# Patient Record
Sex: FEMALE | NOT HISPANIC OR LATINO
[De-identification: names, ages, dates, MRNs, and addresses within clinical notes are randomized per-mention and may not be internally consistent; named-entity substitution may affect disease eponyms.]

---

## 2023-03-23 ENCOUNTER — APPOINTMENT (OUTPATIENT)
Dept: OTOLARYNGOLOGY | Facility: CLINIC | Age: 53
End: 2023-03-23

## 2024-11-01 ENCOUNTER — EMERGENCY (EMERGENCY)
Facility: HOSPITAL | Age: 54
LOS: 1 days | Discharge: HOME | End: 2024-11-01
Attending: STUDENT IN AN ORGANIZED HEALTH CARE EDUCATION/TRAINING PROGRAM | Admitting: STUDENT IN AN ORGANIZED HEALTH CARE EDUCATION/TRAINING PROGRAM
Payer: COMMERCIAL

## 2024-11-01 VITALS
RESPIRATION RATE: 18 BRPM | WEIGHT: 145.95 LBS | OXYGEN SATURATION: 97 % | DIASTOLIC BLOOD PRESSURE: 88 MMHG | HEART RATE: 93 BPM | SYSTOLIC BLOOD PRESSURE: 138 MMHG | TEMPERATURE: 98 F

## 2024-11-01 PROCEDURE — 71046 X-RAY EXAM CHEST 2 VIEWS: CPT | Mod: 26

## 2024-11-01 PROCEDURE — 93010 ELECTROCARDIOGRAM REPORT: CPT

## 2024-11-01 PROCEDURE — 99285 EMERGENCY DEPT VISIT HI MDM: CPT

## 2024-11-01 RX ORDER — ACETAMINOPHEN 500 MG
650 TABLET ORAL ONCE
Refills: 0 | Status: COMPLETED | OUTPATIENT
Start: 2024-11-01 | End: 2024-11-01

## 2024-11-01 RX ORDER — IBUPROFEN 200 MG
600 TABLET ORAL ONCE
Refills: 0 | Status: COMPLETED | OUTPATIENT
Start: 2024-11-01 | End: 2024-11-01

## 2024-11-01 RX ADMIN — Medication 650 MILLIGRAM(S): at 02:57

## 2024-11-01 RX ADMIN — Medication 600 MILLIGRAM(S): at 02:57

## 2024-11-01 NOTE — ED PROVIDER NOTE - PHYSICAL EXAMINATION
GEN: Patient awake and alert. No acute distress, non-toxic. Well appearing.   Head: Normocephalic, atraumatic.  Neck: Nontender, full ROM.   Eyes: PERRLA b/l. EOMI, no scleral icterus, no conjunctival injection. Moist mucous membranes.  CARDIAC: RRR. Normal S1, S2. No murmur, rubs, or gallops. No peripheral edema noted.  PULM: Speaking in full sentences. CTA B/L no wheeze, rales or rhonchi. No signs of respiratory distress, no accessory muscle usage or nasal flaring.  ABD: Soft, nontender, nondistended. No rebound, no involuntary guarding.   MSK: Moving all extremities spontaneously. Full ROM in extremities. No obvious deformity. No anterior chest wall TTP.  NEURO: A&Ox3, no focal neurological deficits, CN 2-12 grossly intact. Following simple commands.  Gait normal.  SKIN: Warm, dry, no rash, no lesions, no open wounds. No urticaria. No jaundice.

## 2024-11-01 NOTE — ED PROVIDER NOTE - IV ALTEPLASE DOOR HIDDEN
[facility]  Cardiology  Progress Note    Patient Name: Kristopher Kevin  MRN: [id number]  Admission Date: 4/8/2019  Hospital Length of Stay: 2 days  Code Status: Full Code   Attending Physician: Nguyễn Moise MD   Primary Care Physician: Jhoan Lundy MD  Expected Discharge Date:   Principal Problem:NSTEMI (non-ST elevated myocardial infarction)    Subjective:     Hospital Course:   4-8:  Admitted with hypertensive emergency  4-9:  Status post PCI of the LAD    Interval History:  No complaints of chest pain shortness of breath this a.m..  Feels like he is getting a little bit better.  He did have pulseless VT requiring defibrillation and was started on amiodarone last night.    Telemetry:  Normal sinus rhythm    Review of Systems   All other systems reviewed and are negative.    Objective:     Vital Signs (Most Recent):  Temp: 96.9 °F (36.1 °C) (04/10/19 0800)  Pulse: 77 (04/10/19 0826)  Resp: (!) 23 (04/10/19 0826)  BP: 111/78 (04/10/19 0800)  SpO2: 96 % (04/10/19 0826) Vital Signs (24h Range):  Temp:  [96.9 °F (36.1 °C)-100.4 °F (38 °C)] 96.9 °F (36.1 °C)  Pulse:  [] 77  Resp:  [20-37] 23  SpO2:  [94 %-100 %] 96 %  BP: ()/(53-83) 111/78     Weight: 109.3 kg (241 lb)  Body mass index is 33.61 kg/m².     SpO2: 96 %  O2 Device (Oxygen Therapy): nasal cannula      Intake/Output Summary (Last 24 hours) at 4/10/2019 0909  Last data filed at 4/10/2019 0800  Gross per 24 hour   Intake 1039.62 ml   Output 5140 ml   Net -4100.38 ml       Lines/Drains/Airways     Drain                 Urethral Catheter 04/08/19 0630 Non-latex;Straight-tip 16 Fr. 2 days          Peripheral Intravenous Line                 Peripheral IV - Single Lumen 04/08/19 0251 Left Hand 2 days         Peripheral IV - Single Lumen 04/08/19 0251 Right Antecubital 2 days                Physical Exam   Constitutional: He is oriented to person, place, and time. He appears well-developed and well-nourished. No distress.    HENT:   Head: Normocephalic and atraumatic.   Eyes: Pupils are equal, round, and reactive to light. Conjunctivae and EOM are normal.   Neck: Normal range of motion. Neck supple. No thyromegaly present.   Cardiovascular: Normal rate, regular rhythm and normal heart sounds.   No murmur heard.  Pulmonary/Chest: Effort normal and breath sounds normal. No respiratory distress. He has no wheezes. He has no rales. He exhibits no tenderness.   Abdominal: Soft. Bowel sounds are normal.   Musculoskeletal: He exhibits no edema.   Neurological: He is alert and oriented to person, place, and time.   Skin: Skin is warm and dry.   Psychiatric: He has a normal mood and affect. His behavior is normal.       Significant Labs:   CMP   Recent Labs   Lab 04/09/19  0315 04/10/19  0238   * 135*   K 3.7 3.5    99   CO2 23 27   * 114*   BUN 22* 24*   CREATININE 0.9 1.0   CALCIUM 8.9 8.9   PROT 6.3 6.3   ALBUMIN 2.3* 2.1*   BILITOT 1.4* 1.3*   ALKPHOS 64 70   AST 19 28   ALT 39 36   ANIONGAP 10 9   ESTGFRAFRICA >60 >60   EGFRNONAA >60 >60   , CBC   Recent Labs   Lab 04/09/19  0315 04/10/19  0238   WBC 10.63 10.73   HGB 12.1* 11.5*   HCT 37.3* 35.5*   * 443*   , INR No results for input(s): INR, PROTIME in the last 48 hours., Lipid Panel No results for input(s): CHOL, HDL, LDLCALC, TRIG, CHOLHDL in the last 48 hours. and Troponin   Recent Labs   Lab 04/09/19 0315   TROPONINI 4.694*       Significant Imaging: Echocardiogram:   Transthoracic echo (TTE) complete (Cupid Only):   Results for orders placed or performed during the hospital encounter of 04/08/19   Transthoracic echo (TTE) 2D with Color Flow   Result Value Ref Range    BSA 2.34 m2    TDI SEPTAL 0.04     LV LATERAL E/E' RATIO 13.38     LV SEPTAL E/E' RATIO 26.75     LA WIDTH 4.34 cm    AORTIC VALVE CUSP SEPERATION 2.58 cm    TDI LATERAL 0.08     PV PEAK VELOCITY 1.09 cm/s    LVIDD 4.70 3.5 - 6.0 cm    IVS 1.51 (A) 0.6 - 1.1 cm    PW 1.45 (A) 0.6 - 1.1 cm     Ao root annulus 3.58 cm    LVIDS 3.98 2.1 - 4.0 cm    FS 15 28 - 44 %    LA volume 59.68 cm3    Sinus 3.23 cm    STJ 2.78 cm    Ascending aorta 3.04 cm    LV mass 288.17 g    LA size 2.94 cm    RVDD 4.27 cm    TAPSE 2.00 cm    RV S' 16.79 m/s    Left Ventricle Relative Wall Thickness 0.62 cm    AV mean gradient 4.42 mmHg    AV valve area 2.99 cm2    AV Velocity Ratio 0.98     AV index (prosthetic) 0.92     E/A ratio 2.02     Mean e' 0.06     E wave decelartion time 151.03 msec    IVRT 0.10 msec    Pulm vein S/D ratio 0.69     LVOT diameter 2.03 cm    LVOT area 3.23 cm2    LVOT peak shree 8.0989622881 m/s    LVOT peak VTI 22.12 cm    Ao peak shree 1.23 m/s    Ao VTI 23.93 cm    LVOT stroke volume 71.56 cm3    AV peak gradient 6.05 mmHg    E/E' ratio 17.83     MV Peak E Shree 1.07 m/s    TR Max Shree 3.06 m/s    MV Peak A Shree 0.53 m/s    PV Peak S Shree 0.44 m/s    PV Peak D Shree 0.64 m/s    LV Systolic Volume 69.32 mL    LV Systolic Volume Index 30.4 mL/m2    LV Diastolic Volume 102.41 mL    LV Diastolic Volume Index 44.86 mL/m2    LA Volume Index 26.1 mL/m2    LV Mass Index 126.2 g/m2    RA Major Axis 4.58 cm    Left Atrium Minor Axis 5.39 cm    Left Atrium Major Axis 5.62 cm    Triscuspid Valve Regurgitation Peak Gradient 37.45 mmHg    RA Width 4.30 cm    Right Atrial Pressure (from IVC) 3 mmHg    TV rest pulmonary artery pressure 40 mmHg   · Moderately decreased left ventricular systolic function. The estimated ejection fraction is 30%  · Concentric left ventricular hypertrophy.  · Grade III (severe) left ventricular diastolic dysfunction consistent with restrictive physiology.  · Elevated left atrial pressure.  · Mildly to moderately reduced right ventricular systolic function.  · The estimated PA systolic pressure is 40 mm Hg     R/LHC:  · Single vessel coronary artery disease.  · Successful PCI of culprit proximal LAD lesion.  · Prox LAD lesion , 80% stenosed reduced to 0%..  · A STENT RESOLUTE MARC [id number] stent was  show successfully placed at 12 ISABELA  · Filling pressures on the right and left are severely elevated. Pulmonary HTN is moderate.  · Post stent deployment IVUS showed adequate deployment without significant residual at the stent edges        Assessment and Plan:     Brief HPI:  VT this a.m. requiring defibrillation.  Stable now on amiodarone.  Correction of electrolytes and continue diuresis.    * NSTEMI (non-ST elevated myocardial infarction)  late presenting MI  Status post PCI of the LAD  Continue medicines for secondary prevention    VT (ventricular tachycardia)  Post MI received defibrillation 4-10  On amiodarone IV  Also with electrolyte abnormalities to be corrected by primary  Will need life vest prior to discharge    Acute congestive heart failure  Acute on chronic Combined systolic and diastolic  Improved post diuresis  Decrease Lasix to 40 IV b.i.d.    Hypertensive emergency  Stable after addition oral meds      Diabetes mellitus type II, uncontrolled  Per IM    Tobacco use disorder  Counseled    Hyperlipidemia  Initiate statin  Follow LFTs    Obesity, unspecified  Diet and exercise    Abnormal LFTs  Currently stable and likely congestive  Received amiodarone in the ED  Currently will do half dose statin and continue follow closely    Acute respiratory failure with hypoxia and hypercapnia  Secondary to pulmonary edema  Currently stable on BiPAP  Pulmonary following      Continue ICU observation    VTE Risk Mitigation (From admission, onward)        Ordered     IP VTE HIGH RISK PATIENT  Once      04/08/19 0519     Place NANCY hose  Until discontinued      04/08/19 0519     Place sequential compression device  Until discontinued      04/08/19 0519          Adrian Jacobo MD  Cardiology  [facility]

## 2024-11-01 NOTE — ED PROVIDER NOTE - PROGRESS NOTE DETAILS
Chuck Gomez MD PGY2: Patient reassessed, stable. CXR negative. Patient declining labs at this time, reports had normal labs including troponin yesterday for same pain. I spoke with the patient and recommended further evaluation and management for their symptoms, however they would prefer to go home right now rather than stay for further management in the ED, CDU or hospital. They verbalize understanding that this is not the optimal or recommended plan. Patient is alert and oriented to person, place and time and demonstrates understanding of risks and consequences in their decision. The patient would be appropriate for outpatient evaluation and management, but with the important caveat that they must follow up closely with primary care doctor as soon as possible and monitor their symptoms very closely with specific instructions on strict ED return precautions.

## 2024-11-01 NOTE — ED ADULT NURSE NOTE - OBJECTIVE STATEMENT
Pt arrives to ED A&Ox4, ambulatory at baseline. Pt C/O left sided chest discomfort x 1 day. Pt is an employee upstairs. PMHx of HTN. Pt denies SOB, dizziness, headache. Respirations are even and unlabored, abdomen is soft and nondistended, capillary refill is 2 seconds bilaterally. Pt does not want blood work at this time. Pt only wants chest xray. Bed in lowest position, safety maintained.

## 2024-11-01 NOTE — ED PROVIDER NOTE - NSFOLLOWUPINSTRUCTIONS_ED_ALL_ED_FT
You were seen in the ED today for chest pain.     Your work up included XRAY. Your results are included in your paperwork.    You opted not to repeat blood work or cardiac enzyme (troponin) test as you had a reported negative test yesterday.     Please follow up promptly with your cardiologist within the next 3 days.     You may take 500-1000 mg acetaminophen every 6 hours, as needed for pain.  You may take 600 mg ibuprofen every 8 hours, with food, as needed for pain.     Follow up with your primary physician in 1-2 days. If needed call [phone] to find a primary care physician or call  [phone] to schedule an appointment with the general medicine.    If you experience any of the following please return to the ED:  - Chest pain  - Trouble breathing   - You cough or vomit blood.  - Your bowel movements are black or bloody.  - You cannot stop vomiting, or it hurts to swallow.    1. TAKE ALL MEDICATIONS AS DIRECTED.    2. FOR PAIN OR FEVER YOU CAN TAKE IBUPROFEN (MOTRIN, ADVIL) OR ACETAMINOPHEN (TYLENOL) AS NEEDED, AS DIRECTED ON PACKAGING.  3. FOLLOW UP WITH YOUR PRIMARY DOCTOR WITHIN 5 DAYS AS DIRECTED.  4. IF YOU HAD LABS OR IMAGING DONE, YOU WERE GIVEN COPIES OF ALL LABS AND/OR IMAGING RESULTS FROM YOUR ER VISIT--PLEASE TAKE THEM WITH YOU TO YOUR FOLLOW UP APPOINTMENTS.  5. RETURN TO THE ER FOR ANY WORSENING SYMPTOMS OR CONCERNS..  ----------------------------------------------------------------------------------------------------------------   Chest Pain    WHAT YOU NEED TO KNOW:    What do I need to know about chest pain? Chest pain can be caused by a range of conditions, from not serious to life-threatening. It is important to follow up with your healthcare provider to find the cause of your chest pain.    What may cause or increase my risk for chest pain?    A digestion problem, such as acid reflux or a stomach ulcer    An anxiety attack or a strong emotion, such as anger    Infection, inflammation, or a fracture in the bones or cartilage in your chest    Poor blood flow to your heart (angina)    A life-threatening condition, such as a heart attack or blood clot in your lungs  What other symptoms might I have with chest pain?    A burning feeling behind your breastbone    A racing or slow heartbeat    Fever or sweating    Nausea or vomiting    Shortness of breath    Discomfort or pressure that spreads from your chest to your back, jaw, or arm    Feeling weak, tired, or faint  How is the cause of chest pain diagnosed? Your healthcare provider will examine you. Describe your chest pain in as much detail as possible. Tell him or her where your pain is and when it began. Tell the provider if you notice anything that makes the pain worse or better. Tell him or her if it is constant or comes and goes. Also include any other symptoms you have with the chest pain, such as sweating or nausea. Your provider will ask about any medicines you use and medical conditions you have. Tell him or her if you have a family history of heart disease. You may also need any of the following tests:    An EKG is a test that records your heart's electrical activity.    Blood tests check for heart damage and signs of a heart attack.    An echocardiogram uses sound waves to see if blood is flowing normally through your heart.    An ultrasound, x-ray, CT, or MRI scan may show the cause of your chest pain. You may be given contrast liquid to help your heart show up better in the pictures. Tell the healthcare provider if you have ever had an allergic reaction to contrast liquid. Do not enter the MRI room with anything metal. Metal can cause serious injury. Tell the provider if you have any metal in or on your body.    An endoscopy may be done to check for ulcers or problem with your esophagus.  Upper Endoscopy  How is chest pain treated?    Medicines may be given to treat the cause of your chest pain. Examples include pain medicine, anxiety medicine, or medicines to increase blood flow to your heart. Do not take certain medicines without asking your healthcare provider first. These include NSAIDs, herbal or vitamin supplements, and hormones, such as estrogen or progestin.    A stent may be placed if your chest pain is caused by blockage in your heart. A stent is a wire mesh tube that helps hold your artery open. You may need more than 1 stent.  Coronary Artery Angioplasty (Stent)  What are some healthy living tips? If the cause of your chest pain is known, your healthcare provider will give you specific guidelines to follow. The following are general healthy guidelines:    Do not smoke. Nicotine and other chemicals in cigarettes and cigars can cause lung and heart damage. Ask your healthcare provider for information if you currently smoke and need help to quit. E-cigarettes or smokeless tobacco still contain nicotine. Talk to your healthcare provider before you use these products.    Choose a variety of healthy foods as often as possible. Include fresh, frozen, or canned fruits and vegetables. Also include low-fat dairy products, fish, chicken (without skin), and lean meats. Your healthcare provider or a dietitian can help you create meal plans. You may need to avoid certain foods or drinks if your pain is caused by a digestion problem.  Healthy Foods      Lower your sodium (salt) intake. Limit foods that are high in sodium, such as canned foods, salty snacks, and cold cuts. If you add salt when you cook food, do not add more at the table. Choose low-sodium canned foods as much as possible.        Drink plenty of water every day. Water helps your body to control your temperature and blood pressure. Ask your healthcare provider how much water you should drink every day.    Ask about activity. Your healthcare provider will tell you which activities to limit or avoid. Ask when you can drive, return to work, and have sex. Ask about the best exercise plan for you.    Maintain a healthy weight. Ask your healthcare provider what a healthy weight is for you. Ask him or her to help you create a weight loss plan if you are overweight.    Ask about vaccines you may need. Your healthcare provider can tell you which vaccines you need, and when to get them. The following vaccines help prevent diseases that can become serious for a person with a heart condition:  The influenza (flu) vaccine is given each year. Get a flu vaccine as soon as recommended, usually in September or October.    The pneumonia vaccine is usually given every 5 years. Your healthcare provider may recommend the pneumonia vaccine if you are 65 or older.    COVID-19 vaccines are given to adults as a shot. At least 1 dose of an updated vaccine is recommended for all adults. COVID-19 vaccines are updated throughout the year. Adults 65 or older need a second dose of updated vaccine at least 4 months after the first dose. Your healthcare provider can help you schedule all needed doses as updated vaccines become available.  Prevent Heart Disease   Call your local emergency number (911 in the [place]) or have someone call if:    You have any of the following signs of a heart attack:  Squeezing, pressure, or pain in your chest    You may also have any of the following:  Discomfort or pain in your back, neck, jaw, stomach, or arm    Shortness of breath    Nausea or vomiting    Lightheadedness or a sudden cold sweat    When should I seek immediate care?    You have chest discomfort that gets worse, even with medicine.    You cough or vomit blood.    Your bowel movements are black or bloody.    You cannot stop vomiting, or it hurts to swallow.  When should I call my doctor?    You have questions or concerns about your condition or care.

## 2024-11-01 NOTE — ED ADULT NURSE NOTE - CHIEF COMPLAINT QUOTE
Pt c/o L sided chest discomfort since yesterday morning. Denies any SOB, dizziness, headache. Hx of HTN. Well appearing

## 2024-11-01 NOTE — ED PROVIDER NOTE - PATIENT PORTAL LINK FT
You can access the FollowMyHealth Patient Portal offered by [facility] by registering at the following website: [email or web address]. By joining [facility]’s FollowMyHealth portal, you will also be able to view your health information using other applications (apps) compatible with our system.

## 2024-11-01 NOTE — ED PROVIDER NOTE - CLINICAL SUMMARY MEDICAL DECISION MAKING FREE TEXT BOX
54-year-old female history of prediabetes presenting to the ED for 1 to 2 days of atraumatic left-sided chest pain under her breast/left inframammary crease region.  Denies associated trauma.  No history of cardiac disease.  No associated dyspnea, diaphoresis, abdominal pain, nausea/vomiting.  Afebrile.  Hemodynamically stable.  Exam unremarkable, no lower extremity pain or swelling, erythema.  EKG NSR without acute ischemic changes.  DDx includes is not limited to ACS versus musculoskeletal pain.  Low suspicion for PNA or PTX.  Offered pain patient repeat blood work/cardiac enzymes but deferring at this time due to reportedly negative troponin yesterday outpatient for same pain.  Patient amenable to the chest x-ray.  Will give Tylenol/Motrin for pain.  Anticipate DC at patient's request with outpatient cardiologist whom she reports can follow-up with him in the next 3 days.

## 2024-11-01 NOTE — ED ADULT TRIAGE NOTE - CHIEF COMPLAINT QUOTE
Pt c/o L sided chest discomfort since yesterday morning. Denies any SOB, dizziness, headache. Hx of HTN Pt c/o L sided chest discomfort since yesterday morning. Denies any SOB, dizziness, headache. Hx of HTN. Well appearing

## 2024-11-01 NOTE — ED PROVIDER NOTE - ATTENDING CONTRIBUTION TO CARE
53 y/o F, PMH of pre-diabetes (not on meds), presents to ED c/o Lt-side CP (underneath the breast) x 2 days. Patient states symptoms started after she slept in her hammock. Patient reports that she thinks it is a muscle strain because it is triggered with lifting movements. Patient was seen by PCP 2 days ago and had negative labs, including troponin. Denies h/o cardiac disease. Denies HA, SOB, f/c, LE edema, or any other symptoms at this time. Physical exam significant for reproducible point tenderness to left lateral chest wall underneath the breast. No signs of rash. Otherwise exam unremarkable. EKG is NSR w/o ischemic changes. Low suspicion for ACS vs. PE vs. PNA vs. PTX. Likely MSK. Pt offered labs and CXR, however through shared decision making patient refusing labs at this time. Will obtain CXR and provide analgesia. Dispo pending results and reassessment.

## 2024-11-01 NOTE — ED PROVIDER NOTE - OBJECTIVE STATEMENT
54-year-old female with history of prediabetes presenting to ED for x 1 to 2 days of left sided chest pain under her breast/inframammary crease region.  Patient denies any trauma.  Reports she feels like it might be a muscle strain/musculoskeletal pain.  No history of cardiac disease.  No associated dyspnea, abdominal pain, nausea/vomiting/diarrhea.  Denies fevers and cough.  No lower extremity pain or swelling.  Patient reportedly saw her PCP for similar pain and had negative troponin.  Coming to ED because reports pain is persistent 5 out of 10 and not going away.